# Patient Record
Sex: FEMALE | Race: WHITE | NOT HISPANIC OR LATINO | Employment: OTHER | URBAN - NONMETROPOLITAN AREA
[De-identification: names, ages, dates, MRNs, and addresses within clinical notes are randomized per-mention and may not be internally consistent; named-entity substitution may affect disease eponyms.]

---

## 2024-02-22 LAB
CHOLEST SERPL-MSCNC: 165 MG/DL (ref 0–200)
LDLC SERPL CALC-MCNC: 80 MG/DL (ref 0–160)
LIPIDS, HDL CHOLESTEROL: 59
TSH: 4.92

## 2024-02-27 LAB — BMD RECOMMENDATION EXT: NORMAL

## 2024-04-19 LAB — BCS RECOMMENDATION EXT: NORMAL

## 2024-10-28 ENCOUNTER — OFFICE VISIT (OUTPATIENT)
Dept: FAMILY MEDICINE | Facility: CLINIC | Age: 79
End: 2024-10-28
Payer: MEDICARE

## 2024-10-28 ENCOUNTER — DOCUMENTATION ONLY (OUTPATIENT)
Dept: FAMILY MEDICINE | Facility: CLINIC | Age: 79
End: 2024-10-28

## 2024-10-28 VITALS
DIASTOLIC BLOOD PRESSURE: 70 MMHG | HEIGHT: 62 IN | BODY MASS INDEX: 30.07 KG/M2 | WEIGHT: 163.38 LBS | SYSTOLIC BLOOD PRESSURE: 134 MMHG | OXYGEN SATURATION: 98 % | TEMPERATURE: 98 F | HEART RATE: 66 BPM

## 2024-10-28 DIAGNOSIS — N32.81 OVERACTIVE BLADDER: Primary | ICD-10-CM

## 2024-10-28 DIAGNOSIS — R79.89 ELEVATED TSH: ICD-10-CM

## 2024-10-28 DIAGNOSIS — Z20.828 EXPOSURE TO INFLUENZA: ICD-10-CM

## 2024-10-28 DIAGNOSIS — E07.9 THYROID DISORDER: ICD-10-CM

## 2024-10-28 LAB
EST. AVERAGE GLUCOSE BLD GHB EST-MCNC: 108.3 MG/DL (ref 70–115)
HBA1C MFR BLD: 5.4 % (ref 4–6)
TSH SERPL-ACNC: 4.82 UIU/ML (ref 0.36–3.74)

## 2024-10-28 PROCEDURE — 36415 COLL VENOUS BLD VENIPUNCTURE: CPT | Performed by: NURSE PRACTITIONER

## 2024-10-28 PROCEDURE — 83036 HEMOGLOBIN GLYCOSYLATED A1C: CPT | Performed by: NURSE PRACTITIONER

## 2024-10-28 PROCEDURE — 84443 ASSAY THYROID STIM HORMONE: CPT | Performed by: NURSE PRACTITIONER

## 2024-10-28 PROCEDURE — 99204 OFFICE O/P NEW MOD 45 MIN: CPT | Mod: ,,, | Performed by: NURSE PRACTITIONER

## 2024-10-28 RX ORDER — ATORVASTATIN CALCIUM 40 MG/1
40 TABLET, FILM COATED ORAL DAILY
COMMUNITY
Start: 2024-08-04

## 2024-10-28 RX ORDER — ALENDRONATE SODIUM 70 MG/1
70 TABLET ORAL
COMMUNITY
Start: 2024-08-25

## 2024-10-28 RX ORDER — OXYBUTYNIN CHLORIDE 5 MG/1
5 TABLET ORAL 2 TIMES DAILY
Qty: 60 TABLET | Refills: 11 | Status: SHIPPED | OUTPATIENT
Start: 2024-10-28 | End: 2025-10-28

## 2024-10-28 NOTE — PROGRESS NOTES
"Patient ID: Gayatri Villarreal  : 1945    Chief Complaint: medication refills    Allergies: Patient has No Known Allergies.     History of Present Illness:  The patient presents to clinic for medication refills and exposure to influenza.     Social History:  reports that she has never smoked. She has never used smokeless tobacco. She reports current alcohol use. She reports current drug use.    Past Medical History:  has a past medical history of Hyperlipidemia.    Surgical History:   Past Surgical History:   Procedure Laterality Date    TUBAL LIGATION         Current Medications:  Current Outpatient Medications   Medication Instructions    alendronate (FOSAMAX) 70 mg, Every 7 days    atorvastatin (LIPITOR) 40 mg, Daily    oseltamivir (TAMIFLU) 75 mg, Oral, 2 times daily    oxybutynin (DITROPAN) 5 mg, Oral, 2 times daily       Review of Systems   A comprehensive review of symptoms was completed and negative except as noted above.    Visit Vitals  /70   Pulse 66   Temp 98.2 °F (36.8 °C)   Ht 5' 2" (1.575 m)   Wt 74.1 kg (163 lb 6.4 oz)   SpO2 98%   BMI 29.89 kg/m²       Physical Exam  Vitals and nursing note reviewed.   Constitutional:       General: She is not in acute distress.     Appearance: Normal appearance. She is not toxic-appearing.   HENT:      Head: Normocephalic and atraumatic.      Right Ear: Tympanic membrane, ear canal and external ear normal.      Left Ear: Tympanic membrane, ear canal and external ear normal.      Nose: Nose normal.      Mouth/Throat:      Mouth: Mucous membranes are moist.      Pharynx: Oropharynx is clear.   Eyes:      Extraocular Movements: Extraocular movements intact.      Conjunctiva/sclera: Conjunctivae normal.      Pupils: Pupils are equal, round, and reactive to light.   Cardiovascular:      Rate and Rhythm: Normal rate and regular rhythm.      Heart sounds: Normal heart sounds. No murmur heard.     No friction rub. No gallop.   Pulmonary:      Effort: Pulmonary " effort is normal.      Breath sounds: Normal breath sounds.   Musculoskeletal:         General: Normal range of motion.      Cervical back: Normal range of motion and neck supple.   Skin:     General: Skin is warm and dry.      Coloration: Skin is not jaundiced or pale.      Findings: No rash.   Neurological:      General: No focal deficit present.      Mental Status: She is alert and oriented to person, place, and time. Mental status is at baseline.   Psychiatric:         Mood and Affect: Mood normal.         Behavior: Behavior normal.         Thought Content: Thought content normal.         Judgment: Judgment normal.          Labs Reviewed:  Chemistry:  Lab Results   Component Value Date    TSH 4.820 (H) 10/28/2024        Lab Results   Component Value Date    HGBA1C 5.4 10/28/2024        Lipid Panel:  Lab Results   Component Value Date    CHOL 165 02/22/2024        Assessment & Plan:  1. Overactive bladder  -     oxybutynin (DITROPAN) 5 MG Tab; Take 1 tablet (5 mg total) by mouth 2 (two) times daily.  Dispense: 60 tablet; Refill: 11    2. Elevated TSH  -     Hemoglobin A1C  -     TSH    3. Thyroid disorder  -     TSH    4. Exposure to influenza  -     oseltamivir (TAMIFLU) 75 MG capsule; Take 1 capsule (75 mg total) by mouth 2 (two) times daily. for 5 days  Dispense: 10 capsule; Refill: 0         Follow up in about 4 weeks (around 11/25/2024) for Virtual Visit, Med eval.   Return to the clinic as needed.        BLANCA Mcknight

## 2024-10-31 RX ORDER — OSELTAMIVIR PHOSPHATE 75 MG/1
75 CAPSULE ORAL 2 TIMES DAILY
Qty: 10 CAPSULE | Refills: 0 | Status: SHIPPED | OUTPATIENT
Start: 2024-10-31 | End: 2024-11-05

## 2025-03-06 ENCOUNTER — PATIENT MESSAGE (OUTPATIENT)
Dept: FAMILY MEDICINE | Facility: CLINIC | Age: 80
End: 2025-03-06
Payer: MEDICARE

## 2025-04-08 PROCEDURE — 83036 HEMOGLOBIN GLYCOSYLATED A1C: CPT | Performed by: NURSE PRACTITIONER

## 2025-04-08 PROCEDURE — 85025 COMPLETE CBC W/AUTO DIFF WBC: CPT | Performed by: NURSE PRACTITIONER

## 2025-04-08 PROCEDURE — 84443 ASSAY THYROID STIM HORMONE: CPT | Performed by: NURSE PRACTITIONER

## 2025-04-08 PROCEDURE — 80053 COMPREHEN METABOLIC PANEL: CPT | Performed by: NURSE PRACTITIONER

## 2025-04-08 PROCEDURE — 82306 VITAMIN D 25 HYDROXY: CPT | Performed by: NURSE PRACTITIONER

## 2025-04-08 PROCEDURE — 82607 VITAMIN B-12: CPT | Performed by: NURSE PRACTITIONER

## 2025-04-08 PROCEDURE — 80061 LIPID PANEL: CPT | Performed by: NURSE PRACTITIONER

## 2025-04-09 ENCOUNTER — OFFICE VISIT (OUTPATIENT)
Dept: FAMILY MEDICINE | Facility: CLINIC | Age: 80
End: 2025-04-09
Payer: MEDICARE

## 2025-04-09 DIAGNOSIS — Z91.81 AT RISK FOR FALLS: ICD-10-CM

## 2025-04-09 DIAGNOSIS — R79.89 ELEVATED TSH: ICD-10-CM

## 2025-04-09 DIAGNOSIS — Z00.00 MEDICARE ANNUAL WELLNESS VISIT, SUBSEQUENT: Primary | ICD-10-CM

## 2025-04-09 DIAGNOSIS — Z00.00 ENCOUNTER FOR PREVENTIVE HEALTH EXAMINATION: ICD-10-CM

## 2025-04-09 RX ORDER — ACYCLOVIR 50 MG/G
OINTMENT TOPICAL
COMMUNITY
Start: 2025-01-21

## 2025-04-13 VITALS
SYSTOLIC BLOOD PRESSURE: 152 MMHG | HEIGHT: 62 IN | DIASTOLIC BLOOD PRESSURE: 74 MMHG | WEIGHT: 161.38 LBS | BODY MASS INDEX: 29.7 KG/M2 | OXYGEN SATURATION: 99 % | HEART RATE: 62 BPM | TEMPERATURE: 98 F

## 2025-04-14 NOTE — PATIENT INSTRUCTIONS
Medicare Annual Wellness Visit      Patient Name: Gayatri Villarreal  Today's Date: 4/13/2025    Below you will find your 5-10 year Screening Plan Recommendations:  Health Maintenance       Date Due Completion Date    Shingles Vaccine (2 of 3) 10/28/2025 (Originally 3/3/2016) 1/7/2016    DEXA Scan 02/27/2026 2/27/2024    TETANUS VACCINE 03/14/2026 3/14/2016    Lipid Panel 04/08/2030 4/8/2025          Below is your summarized Personalized Prevention Plan that addresses any concerns we discussed today at your visit. Please see attached detailed information specific to your Health Concerns.  No orders of the defined types were placed in this encounter.        The following information is provided to all patients.  This information is to help you find additional resources for any problems that may be affecting your health: Living healthy guide: www.[facility].[place]      Understanding Diabetes: www.diabetes.org      Eating healthy: www.cdc.gov/healthyweight      [facility] home safety checklist: www.cdc.gov/steadi/patient.html      Agency on Aging: www.goea.[place].[facility]      Alcoholics anonymous (AA): www.aa.org      Physical Activity: www.dana.nih.gov/tx4jqyh      Tobacco use: www.quitwithusla.org                                 Patient Education       Exercise and Aging   General   Exercise can help older adults prevent falls and stay independent longer. Exercise may also help:  You have stronger muscles and bones and better balance  You lose weight and have more energy  Make your heart and lungs stronger  Lower your chance of health problems like heart disease, high blood sugar, or breast or colon cancer  Control conditions like high blood pressure and diabetes  You manage stress and get better sleep  Your mood, self-esteem, and self-image  How you think, plan, and pay attention  There are four types of exercise: endurance, strength, balance, and flexibility.  Endurance exercises get your heart rate up and keep it up for a  while. Most people should get at least 30 minutes of exercise that gets your heart rate up on 5 or more days each week. Walking, swimming, biking, or going up and down stairs are kinds of exercises to get your heart rate up. You do not have to do all 30 minutes at one time. Try doing 10 minutes 3 times a day.  Strength exercises build muscle. Lifting weights or doing knee bends are kinds of strength exercises.  Balance exercises help to prevent falls. Raise up on your toes or stand on one leg as a kind of balance exercise.  Flexibility exercises move your joints through a full range of motion and stretch your muscles. Bend forward in a chair to stretch your back, do stretches, or bend and extend your arms or legs as a kind of flexibility exercise. Try doing 10 minutes twice a week.  Plan to include all these exercise types in your exercise program. Always check with your doctor before you start a new exercise program.  Some people do not like formal exercise classes, but there are many ways to work your muscles each day. Here are some things you can do.  Use steps instead of elevators.  Park far away in parking lots to walk farther.  Use the time while you wait. Do knee bends as you hold onto the kitchen counter while you wait for your coffee to brew.  When you unload groceries, lift the bags or a container of milk a few times to exercise your arms and legs.  Walk the long way when you go somewhere.  After you walk to the bathroom, walk a few laps around the house before sitting down.  Try doing different standing exercises after you wash your hands each time in the bathroom.  Do balance exercises while you brush your teeth.  Do an exercise or get up and walk during TV commercials.  Don't forget to exercise small muscle groups like your hands, fingers, ankles, toes, and neck. Wiggle, bend, flex, and rotate these joints from left to right and back to front to help to keep these joints flexible.  When do I need to  call the doctor?   Stop exercising and talk to your doctor if you have any of these problems:  Dizziness  Shortness of breath  Pain or pressure in the chest, arms, throat, jaw, or back  Nausea or vomiting  Blood clots  Infection  Joint swelling  Open wounds  Recent hip, back, or eye surgery  New problems that start during exercise  Helpful tips   If you have a health problem like heart disease or diabetes, talk with your doctor about the best exercises for you.  Always warm up before you stretch. Heated muscles stretch much easier than cool muscles. Try to walk or bike at an easy pace for a few minutes to warm up your muscles.  Slow your pace again after you exercise to cool down and bring your heart rate down slowly.  Be sure you do not hold your breath when you exercise because it can raise your blood pressure. If you tend to hold your breath, try to count out loud when you exercise.  Never bounce when doing stretches. Use slow and steady motions and hold your stretch for 20 to 30 seconds.  Have a routine. Doing exercises before a meal may be a good way to get into a routine.  Set small goals for yourself when you start to exercise. Use a chart to see how much you are doing. Ask someone to exercise with you.  Exercise may be slightly uncomfortable, but you should not have sharp pains. If you do get sharp pains, stop what you are doing. If the sharp pains continue, call your doctor.  Drink plenty of fluids without caffeine when you exercise and afterwards. Avoid outdoor exercise if it is too cold or too hot.  Wear the right clothes and shoes. Try layers of clothes, so that you can take them off if you get too hot. Shoes should fit well and support your feet.  Where can I learn more?   National [place] on Aging  https://www.dana.nih.gov/health/publication/exercise-physical-activity/introduction   Last Reviewed Date   2021-03-18  Consumer Information Use and Disclaimer   This information is not specific medical  advice and does not replace information you receive from your health care provider. This is only a brief summary of general information. It does NOT include all information about conditions, illnesses, injuries, tests, procedures, treatments, therapies, discharge instructions or life-style choices that may apply to you. You must talk with your health care provider for complete information about your health and treatment options. This information should not be used to decide whether or not to accept your health care providers advice, instructions or recommendations. Only your health care provider has the knowledge and training to provide advice that is right for you.  Copyright   Copyright © 2021 [employer], Inc. and its affiliates and/or licensors. All rights reserved.    Patient Education       Preventing Falls in the Older Adult   About this topic   A fall is the sudden loss of balance that causes a person to drop to the ground or floor. Falls are a serious health risk and they happen more often as we get older. Many things may increase your risk of falling, like:  Problems that come with getting older  Muscle weakness  Balance problems  More trouble seeing  Personal health factors  Health conditions such as arthritis, Parkinson's disease, low blood pressure, or stroke  Medicines you take  Loss of feeling in your feet  Being less active  Taking drugs that makes you dizzy or drowsy  Habits like alcohol use  Things around your house  Slippery floors  Unsecured rugs  Stairs  Wearing improper fitting shoes  Areas where it is dark and difficult to see  Incorrect size or type of assistive devices  Clutter and items on the floor that block your walkway     What will the results be?   Prevent future falls  Avoid injuries and disabilities  Improve overall health  Will there be any other care needed?   Ask your doctor if you need to take vitamin D to help keep your bones strong.  Make your home safer. Get rid of things that  might make you trip or slip. These are things like loose rugs, electrical cords, or clutter. Add grab bars, a shower seat, and handrails.  Wear sturdy shoes that fit well. Shoes should fit well, have a low heel, and the soles of the shoe should not be slippery. Walking in socks or with bare feet can raise your chance for falling.  Stay active. Walk, garden, swim, or do something active on a regular basis. These activities may prevent you from getting hurt if you do fall. They also help with your strength and balance.  Use a cane, walker, or other safety device. Be sure it is the right size for you and that you know how to use it safely. Be sure to wear your eyeglasses if they have been ordered for you.  Get up slowly after you sit or lie down. Try to change positions slowly.  What to do if you fall:  Stay calm and do not panic.  Look for signs to decide if you have been hurt or have an injury.  If you think you can get up safely, try to get up.  If you are hurt or cannot get up on your own, try to get help.  If no one is available to help, try to get comfortable and wait for someone to arrive who can help you.  Stay warm and move regularly as you are able. Avoid putting too much pressure on any one area.  After a fall, tell family and friends that you have fallen. It is also important to talk to your doctor about your fall right away.  What problems could happen?   A fall can lead to broken bones and other serious injuries in older adults. Problems that happen because of a fall may even lead to death in older adults. Many people are not able to return to their former level of activity after a fall.  What can be done to prevent this health problem?   Lower Your Risk of Falling  Wear your eyeglasses. Have regular eye checkups. Do not use reading glasses when you walk around.  Quit smoking and limit alcohol intake. Smoking and too much alcohol can decrease bone mass and increase the chance of broken bones.  Know the  side effects of the drugs you are taking. Some drugs may affect your balance and cause confusion or sleepiness.  Get up slowly after you sit or lie down. Do not change positions quickly. Do not rush when you need to go to the bathroom or to answer the phone.  Stay Physically Active  Be physically active. This will help to improve your strength and balance.  Fear of falling may lead you to avoid activities. Talk to your doctor. You may be sent to a physical therapist. This person can help you improve balance and build your confidence. Getting rid of your fear of falling can help you stay active and prevent future falls.  Join an exercise program. Ask your doctor what exercise is safe for you. Be sure to ask before you do any exercises, especially if you have illnesses like arthritis. Exercise can help you keep muscles strong and help with your balance. It is also a good way to learn proper ways to do each activity or exercise.  Safety Tips at Home  Keep your floors and walking areas clear from clutter. Remove furniture that blocks your way. Secure cords and wires near the wall to avoid tripping over them. Get rid of throw rugs.  Be sure the lights in your house are working well and provide good lighting throughout your home. Make sure you can reach switches and lamps easily. Place a lamp close to your bed that is easy to reach.  Fix all steps and sidewalks to make them smooth and even. Put handrails and lights on stairs.  Keep all the things you use often on low shelves or in cabinets that are at about waist level. Ask for help to move items off of high shelves. Do not use a chair as a step stool.  Keep your bathroom area safe. Use nonslip rubber mats on the floor and in the tub or shower.  Keep a phone near you in case of emergency. Keep a list of your emergency contact numbers in large print near your phone. Carry a phone with you when you go for a walk. Consider using a personal alarm device that could call for  help in case you fall and cannot get up.  Think about protecting your hip. Hip protectors may be needed if you have a higher chance for falling. Ask your doctor about this.  Where can I learn more?   American Academy of Family Physicians  https://familydoctor.org/xbopi-vmy-tm-lower-your-risk/   NHS  https://www.nhs.uk/conditions/falls/prevention/   Last Reviewed Date   2021-06-07  Consumer Information Use and Disclaimer   This information is not specific medical advice and does not replace information you receive from your health care provider. This is only a brief summary of general information. It does NOT include all information about conditions, illnesses, injuries, tests, procedures, treatments, therapies, discharge instructions or life-style choices that may apply to you. You must talk with your health care provider for complete information about your health and treatment options. This information should not be used to decide whether or not to accept your health care providers advice, instructions or recommendations. Only your health care provider has the knowledge and training to provide advice that is right for you.  Copyright   Copyright © 2021 UpToDate, Inc. and its affiliates and/or licensors. All rights reserved.    Patient Education       Drug Abuse and Drug Addiction Discharge Instructions   About this topic   Drug abuse and addiction can cause you physical and mental health problems. Some drugs can become a habit. Addiction can happen with a drug that is ordered for you or for someone else. It can also happen with other different kinds of drugs. Drug addiction happens when a drug is not used like it was ordered. The drugs may be abused to lower your stress or to make you feel better.  A drug may be taken over and over without the need for it. In other cases, you may develop an addiction and feel a need for the drug. There are many things that may cause drug addiction. But, with the right care, you  can recover and go back to living a healthy life.  Recovery has many steps for finding better ways to deal with problems, other than using drugs. Care most often will use counseling. Other drugs may be needed and work best when used with other therapy.     What care is needed at home?   Ask your doctor what you need to do when you go home. Make sure you ask questions if you do not understand what the doctor says. This way you will know what you need to do.  Most of your recovery will take place after coming home. Work with your doctor. Ask for helpful ideas. Let all of your doctors know about your health problem. Then, they will be able to help you too.  Set a goal and write down a plan for how you are going to recover.  Get support.  Find a support group that is right for you. Once you find a group that you like, get a sponsor. Many people find help in 12-step programs like Alcoholics Anonymous, Narcotics Anonymous, and Cocaine Anonymous. These programs are a way for you to be with others with the same problems to help you. Call a chapter near you for information on the meetings.  Talk with friends and family. They may also be a help to you while you recover. Let them know how to best help you. They may find it helpful to join a 12-step program for family members of one who is addicted, like Denis. These programs will give your family facts about recovery and how they can help you recover.  Be active.  Get involved with others by joining social or Spiritism activities in your town.  Be a volunteer to help others.  Do something new in your life or job and think about the things that are important to you.  Learn to understand your feelings.  Learn to live better with anxiety, anger, conflict, and feeling alone in healthier ways. Your doctor, support group, and sponsor can help with this.  Learn how to lower stress without abusing drugs. Some people enjoy exercise and meditation. Others like to play with pets. Try  listening to music or having a massage. Go for a walk or a drive. Find out what works best for you.  Avoid places, people, or times that would trigger your cravings. Do not spend time with friends who are still abusing drugs. Instead, spend time with people who can help you in your recovery. Avoid going to bars and clubs. Do not drink alcohol. Take extra care with prescription drugs. Some of them have a high chance for abuse. These include sleeping pills, painkillers, and antianxiety drugs.  Find ways to cope with cravings:  Talk with family or friends. This often helps to lower the feeling of cravings. They may also help you do things to keep you from thinking about drugs.  Keep yourself busy with some activities. Get yourself interested in something to make your urges go away. Read a book, watch a movie, ride a bike, or take a walk.  Work to change the way you think. When cravings happen, you may remember the good feelings only and forget the bad things about using drugs. Remind yourself that you will not really feel good if you go back to using drugs.  What follow-up care is needed?   Your doctor may ask you to make visits to the office to check on your progress. Be sure to keep these visits.  Make sure to keep all visits with your counselor.  What drugs may be needed?   The doctor may order medicines to:  Help lower the signs of withdrawing from the drug you were using  Keep you from going back to abusing drugs  Help stop cravings for the drug you were using  Treat possible mental health conditions, such as depression or anxiety, that may be contributing to the addiction  Will physical activity be limited?   Most often, being active can help you. Regular exercise and keeping busy can help you feel good. When you feel good, you may be less tempted to use drugs.  Take good care of your health. Get enough sleep and eat a healthy diet with many kinds of foods.  What problems could happen?   Drug withdrawal. These  are the signs that happen when you quickly stop taking drugs that you have used for a long time. Get the help of your doctor and counselor to check you and help with drug withdrawal.  Mood problems  Worry  Upset stomach and throwing up  Sleep problems  Problems with thinking  Liver problems  You go back to using the drug  Death  What can be done to prevent this health problem?   Learn about the risks of the drugs before using them. Prescription drugs are helpful in caring for a special problem. They are powerful and can make people want to take them much longer than they need to.  Avoid people who are using illegal drugs. Also, avoid those who think it is OK to use drugs without a medical need.  Learn how to deal with pressure from your friends. This is not easy, but learn to say no. Take yourself out of places where you feel pressured to take drugs or drink alcohol.  Build strong healthy family ties and friendships. This way you and your children may lower the chance of drug use.  Ask for help when you have problems with anxiety, fear, low mood, helplessness, or other mental problems.  When do I need to call the doctor?   Signs of a very bad reaction. These include wheezing; chest tightness; fever; itching; bad cough; blue skin color; seizures; or swelling of face, lips, tongue, or throat. Go to the ER right away.  You feel you might hurt yourself or someone else  You feel you are going to start using drugs again  You are not feeling better in 2 to 3 days or you are feeling worse  You have any worries about your health problem.  Helpful tips   If you or someone in your house uses narcotics, you might want to keep naloxone at home. You may be able to buy it at the drug store or your doctor can order it. This drug stops the effects of narcotics and may help stop death from a narcotics overdose. It comes as a nose spray or a shot that you can give. Learn how and when to use it in case of an overdose.  Teach Back:  Helping You Understand   The Teach Back Method helps you understand the information we are giving you. After you talk with the staff, tell them in your own words what you learned. This helps to make sure the staff has described each thing clearly. It also helps to explain things that may have been confusing. Before going home, make sure you can do these:  I can tell you about my condition.  I can tell you how I will deal with my feelings and cravings.  I can tell you what I will do if I feel I might hurt myself or someone else, or I am worried I might start using drugs again.  Where can I learn more?   National [place] on Drug Abuse  http://www.drugabuse.gov/publications/science-addiction/treatment-recovery   Substance Abuse and Mental Health Services Administration  https://www.[facility].gov/atod   Substance Abuse and Mental Health Services Administration  https://www.samhsa.gov/find-help/atod   Last Reviewed Date   2021-06-07  Consumer Information Use and Disclaimer   This information is not specific medical advice and does not replace information you receive from your health care provider. This is only a brief summary of general information. It does NOT include all information about conditions, illnesses, injuries, tests, procedures, treatments, therapies, discharge instructions or life-style choices that may apply to you. You must talk with your health care provider for complete information about your health and treatment options. This information should not be used to decide whether or not to accept your health care providers advice, instructions or recommendations. Only your health care provider has the knowledge and training to provide advice that is right for you.  Copyright   Copyright © 2021 UpToDate, Inc. and its affiliates and/or licensors. All rights reserved.

## 2025-04-14 NOTE — PROGRESS NOTES
"   Internal Medicine    Gayatri Villarreal is a 79 y.o. female here today for a Medicare Annual Wellness visit and comprehensive Health Risk Assessment.     Subjective   The following components were reviewed and updated:  Medical history  Family History  Social history  Allergies  Current Medications  Immunizations  Health Maintenance  Patient Care Team    Review of Systems   Constitutional:  Negative for activity change and unexpected weight change.   HENT:  Negative for hearing loss, rhinorrhea and trouble swallowing.    Eyes:  Negative for discharge and visual disturbance.   Respiratory:  Negative for chest tightness and wheezing.    Cardiovascular:  Negative for chest pain and palpitations.   Gastrointestinal:  Negative for blood in stool, constipation, diarrhea and vomiting.   Endocrine: Negative for polydipsia and polyuria.   Genitourinary:  Negative for difficulty urinating, dysuria, hematuria and menstrual problem.   Musculoskeletal:  Negative for arthralgias, joint swelling and neck pain.   Neurological:  Negative for weakness and headaches.   Psychiatric/Behavioral:  Negative for confusion and dysphoric mood.    All other systems reviewed and are negative.    A comprehensive review of systems was conducted and is negative except as noted above.     Objective   Visit Vitals  BP (!) (P) 149/72   Pulse 62   Temp 97.7 °F (36.5 °C) (Temporal)   Ht 5' 2.01" (1.575 m)   Wt 73.2 kg (161 lb 6.4 oz)   SpO2 99%   BMI 29.51 kg/m²        Physical Exam  Vitals and nursing note reviewed.   Constitutional:       Appearance: Normal appearance. She is normal weight.   HENT:      Head: Normocephalic and atraumatic.      Right Ear: Tympanic membrane, ear canal and external ear normal.      Left Ear: Tympanic membrane, ear canal and external ear normal.      Nose: Nose normal.      Mouth/Throat:      Mouth: Mucous membranes are moist.      Pharynx: Oropharynx is clear.   Eyes:      Extraocular Movements: Extraocular movements " intact.      Conjunctiva/sclera: Conjunctivae normal.      Pupils: Pupils are equal, round, and reactive to light.   Cardiovascular:      Rate and Rhythm: Normal rate and regular rhythm.      Heart sounds: Normal heart sounds.   Pulmonary:      Effort: Pulmonary effort is normal.      Breath sounds: Normal breath sounds.   Abdominal:      General: Abdomen is flat. Bowel sounds are normal. There is no distension.      Palpations: Abdomen is soft. There is no mass.      Tenderness: There is no abdominal tenderness. There is no guarding or rebound.      Hernia: No hernia is present.   Musculoskeletal:         General: Normal range of motion.      Cervical back: Normal range of motion and neck supple.      Right lower leg: No edema.      Left lower leg: No edema.   Skin:     General: Skin is warm and dry.      Coloration: Skin is not jaundiced or pale.      Findings: No rash.   Neurological:      General: No focal deficit present.      Mental Status: She is alert and oriented to person, place, and time. Mental status is at baseline.   Psychiatric:         Mood and Affect: Mood normal.         Behavior: Behavior normal.         Thought Content: Thought content normal.         Judgment: Judgment normal.          Assessment/Plan:  1. Medicare annual wellness visit, subsequent    2. Encounter for preventive health examination    3. At risk for falls    4. Elevated TSH  Patient declined medications and plans to repeat in 6 weeks while in [place].     A comprehensive HEALTH RISK ASSESSMENT was completed today. Results are summarized below:    There are NO EMOTIONAL/SOCIAL CONCERNS identified on today's screening for Social Isolation, Depression and Anxiety.    There are NO COGNITIVE FUNCTION CONCERNS identified on today's screening.  The following FUNCTIONAL AND/OR SAFETY CONCERNS were identified on today's screening for Physical Symptoms, Nutritional, Home Safety/Living Situation, Fall Risk, Activities of Daily Living,  Independent Activities of Daily Living, Physical Activity,Timed Up and Go test and Whisper test::    *Patient reports NO PREVENTIVE HOME HAZARD EVALUATION OR MODIFICATION. ( )  *Patient's WHISPER TEST was ABNORMAL. ( )    The patient reports NO OPIOID PRESCRIPTIONS. This was confirmed through medication reconciliation and the [facility] website.    The patient is NOT A TOBACCO USER.  The patient reports NO SIGNIFICANT ALCOHOL USE.    All Questions regarding food, transportation or housing were not answered today.    I provided Gayatri Villarreal with a 5-10 year written Screening Schedule per USPSTF age appropriate recommendations and a Personal Prevention Plan based on the results of today's Health Risk Assessment. Education, counseling, and referrals were provided as documented above and can be viewed in the After Visit Summary.    Follow up in about 6 months (around 10/9/2025) for BP Check, Follow Up. In addition to this scheduled follow up, the patient has also been instructed to follow up on as needed basis.     none  The patient was asked and declined the use of a free .  Advance Care Planning   Today we discussed advance care planning. She has documents back in [place].

## 2025-08-17 ENCOUNTER — PATIENT MESSAGE (OUTPATIENT)
Dept: FAMILY MEDICINE | Facility: CLINIC | Age: 80
End: 2025-08-17
Payer: MEDICARE